# Patient Record
Sex: FEMALE
[De-identification: names, ages, dates, MRNs, and addresses within clinical notes are randomized per-mention and may not be internally consistent; named-entity substitution may affect disease eponyms.]

---

## 2023-02-08 PROBLEM — Z00.00 ENCOUNTER FOR PREVENTIVE HEALTH EXAMINATION: Status: ACTIVE | Noted: 2023-02-08

## 2023-02-10 PROBLEM — Z76.89 ESTABLISHING CARE WITH NEW DOCTOR, ENCOUNTER FOR: Status: ACTIVE | Noted: 2023-02-10

## 2023-02-10 PROBLEM — C54.1 ENDOMETRIAL CANCER: Status: ACTIVE | Noted: 2023-02-10

## 2023-02-13 ENCOUNTER — APPOINTMENT (OUTPATIENT)
Dept: GYNECOLOGIC ONCOLOGY | Facility: CLINIC | Age: 68
End: 2023-02-13
Payer: MEDICARE

## 2023-02-13 ENCOUNTER — NON-APPOINTMENT (OUTPATIENT)
Age: 68
End: 2023-02-13

## 2023-02-13 VITALS
OXYGEN SATURATION: 95 % | BODY MASS INDEX: 39.27 KG/M2 | SYSTOLIC BLOOD PRESSURE: 166 MMHG | HEIGHT: 60 IN | DIASTOLIC BLOOD PRESSURE: 83 MMHG | WEIGHT: 200 LBS | TEMPERATURE: 97.1 F | HEART RATE: 96 BPM

## 2023-02-13 DIAGNOSIS — Z86.39 PERSONAL HISTORY OF OTHER ENDOCRINE, NUTRITIONAL AND METABOLIC DISEASE: ICD-10-CM

## 2023-02-13 DIAGNOSIS — C54.1 MALIGNANT NEOPLASM OF ENDOMETRIUM: ICD-10-CM

## 2023-02-13 DIAGNOSIS — Z87.891 PERSONAL HISTORY OF NICOTINE DEPENDENCE: ICD-10-CM

## 2023-02-13 DIAGNOSIS — Z76.89 PERSONS ENCOUNTERING HEALTH SERVICES IN OTHER SPECIFIED CIRCUMSTANCES: ICD-10-CM

## 2023-02-13 DIAGNOSIS — Z78.9 OTHER SPECIFIED HEALTH STATUS: ICD-10-CM

## 2023-02-13 DIAGNOSIS — Z86.79 PERSONAL HISTORY OF OTHER DISEASES OF THE CIRCULATORY SYSTEM: ICD-10-CM

## 2023-02-13 DIAGNOSIS — Z80.9 FAMILY HISTORY OF MALIGNANT NEOPLASM, UNSPECIFIED: ICD-10-CM

## 2023-02-13 DIAGNOSIS — Z87.39 PERSONAL HISTORY OF OTHER DISEASES OF THE MUSCULOSKELETAL SYSTEM AND CONNECTIVE TISSUE: ICD-10-CM

## 2023-02-13 PROCEDURE — 99204 OFFICE O/P NEW MOD 45 MIN: CPT

## 2023-02-13 RX ORDER — DOCUSATE SODIUM 100 MG/1
100 CAPSULE ORAL
Refills: 0 | Status: ACTIVE | COMMUNITY

## 2023-02-13 RX ORDER — TRAMADOL HYDROCHLORIDE 50 MG/1
50 TABLET, COATED ORAL
Refills: 0 | Status: ACTIVE | COMMUNITY

## 2023-02-13 RX ORDER — DOXYCYCLINE HYCLATE 100 MG/1
100 CAPSULE ORAL
Qty: 10 | Refills: 0 | Status: ACTIVE | COMMUNITY
Start: 2023-02-01

## 2023-02-13 RX ORDER — CYCLOBENZAPRINE HYDROCHLORIDE 10 MG/1
10 TABLET, FILM COATED ORAL
Refills: 0 | Status: ACTIVE | COMMUNITY

## 2023-02-13 RX ORDER — MISOPROSTOL 200 UG/1
200 TABLET ORAL
Qty: 3 | Refills: 0 | Status: ACTIVE | COMMUNITY
Start: 2023-01-31

## 2023-02-13 RX ORDER — TRAZODONE HYDROCHLORIDE 300 MG/1
TABLET ORAL
Refills: 0 | Status: ACTIVE | COMMUNITY

## 2023-02-13 RX ORDER — DICLOFENAC SODIUM 100 MG/1
100 TABLET, FILM COATED, EXTENDED RELEASE ORAL
Refills: 0 | Status: ACTIVE | COMMUNITY

## 2023-02-13 RX ORDER — ACETAMINOPHEN 500 MG/1
500 TABLET, FILM COATED ORAL
Qty: 2 | Refills: 0 | Status: ACTIVE | COMMUNITY
Start: 2023-01-31

## 2023-02-13 RX ORDER — TELMISARTAN 80 MG/1
80 TABLET ORAL
Refills: 0 | Status: ACTIVE | COMMUNITY

## 2023-02-13 RX ORDER — ROSUVASTATIN CALCIUM 20 MG/1
20 TABLET, FILM COATED ORAL
Refills: 0 | Status: ACTIVE | COMMUNITY

## 2023-02-14 NOTE — PHYSICAL EXAM
[Chaperone Present] : A chaperone was present in the examining room during all aspects of the physical examination [Normal] : Recto-Vaginal Exam: Normal [Fully active, able to carry on all pre-disease performance without restriction] : Status 0 - Fully active, able to carry on all pre-disease performance without restriction [de-identified] : 8 wk size uterus, mobile, no adnexal masses [de-identified] : smooth rectovag septum, no adnexal masses

## 2023-02-14 NOTE — DISCUSSION/SUMMARY
[Reviewed Clinical Lab Test(s)] : Results of clinical tests were reviewed. [Reviewed Radiology Report(s)] : Radiology reports were reviewed. [Reviewed Radiology Film/Image(s)] : Images from radiology studies were reviewed and examined. [FreeTextEntry1] : Patient presenting with new diagnosis of endometrial cancer, high grade. h/o  bleeding , noted to have abnormal pap, then colpo and endometrial bx was performed by Dr. Guy. Cervix is normal on exam.\par discussed dx and standard treatment is hysterectomy/staging. possible need for chemo/RT discussed\par \par Planned for RA - total laparoscopic hysterectomy, bilateral salpingo-oophorectomy, sentinel lymph node mapping and biopsies, staging, possible pelvic and para-aortic lymph node dissection, possible open surgery, cystoscopy, all indicated procedures.\par \par Risks of surgery discussed including bleeding, infection, injury to bowel/bladder/vessels/nerves/ureters/ risk of blood transfusion - pt accepts blood, risk of ICU admission, reoperation, anesthesia risk\par \par Benefits including staging, treatment discussed with patient\par Patient aware that she may need further treatment including chemotherapy or radiation, or both\par All questions answered , Surgery will be pending CT review.\par \par Plan for OR 3/10 [facility]\par med clearance\par pst/covid test\par ct ordered\[facility]

## 2023-02-14 NOTE — REVIEW OF SYSTEMS
Pt rcv'd to rm 7, awake and alert, oriented x 1, Pt was found knocking on the door of a home of occupants who didn't know the patient so police were called per [employer]D, pt heavily intoxicated w/ETOH, pt awaiting provider assessment, not allowing monitor placement, safety measures maintained, sitter @ bedside   [Negative] : Musculoskeletal [Abn Vag Bleeding] : abnormal vaginal bleeding [FreeTextEntry4] : low back pain

## 2023-02-14 NOTE — HISTORY OF PRESENT ILLNESS
[FreeTextEntry1] : GYN/Ref: Dr. Guy\par \par Pt is [place] speaking.  She attends today's visit with her grand-daughter, Zuleima. \par \par Ms. Antoine, 67 years old, referred by Dr. Guy for  high grade endometrial cancer.\par \par Pt had spotting 6 months ago while in [place].  Per patient, she saw a doctor in [place] and was advised it was nothing.  She is still spotting and followed up here in [place] revealing high grade endometrial cancer.  \par \par Denies f/c/n/v/d/changes bladder habits.  Denies unintentional weight loss or gain. Does report constipation, but attributes this to taking Tramadol for low back pain, which radiates to her left side.  \par \par Pathology\par 2/1/23:  EMB:  High grade endometrial carcinoma with glandular and squamous epithelium. ([facility])\par \par 12/10/22:\par Cervix:  LSIL, OLIVIA 1; no endocervical tissue.\par Endocervix::  LSIL, OLIVIA 1.  Separate fragments of benign endocervical tissue are present.  ([facility])\par \par PAP:\par 11/19/22:  ASCUS;  reactive endocervical cells identified;  Endometrial cells present in a woman 45 years or older. ([facility])  \par 2/12/22:  ASCUS; reactive endocervical cells identified.  Endometrial cells present in a woman 45 years or older. ([facility])\par \par Imaging:\par 11/22/22\par UTERUS: The uterus is anteverted, measuring 6.8 x 4.4 x 4.9 cm (77 cc). No discrete fibroids.\par ENDOMETRIUM: Normal thickness, echotexture and morphology; 0.87 cm maximum thickness. There is minimal fluid within the endometrial cavity.\par RIGHT OVARY: Measures 2.1 x 0.8 x 0.8 cm (1 cc). Normal morphology and vascularity. There is no right adnexal lesion.\par LEFT OVARY: Measures 1.4 x 0.8 x 1.1 cm (1 cc). Normal morphology and vascularity. There is no left adnexal lesion.\par FREE FLUID: None.\par IMPRESSION:  \par Minimal endometrial fluid without evidence of focal mass.\par \par Health Maintenance:\par BMI: 39\par Lifestyle: lives with son and grand-daughter; former smoker (smoked for 45 years 1ppd; quit 7 years ago) \par COVID vaccine received:  yes\par Mammogram: 1 year ago in [place] - normal per pt \par Colonoscopy: 6 months ago in [place] - normal per patient.\par PAP: see above\par \par

## 2023-02-14 NOTE — LETTER BODY
[Dear  ___] : Dear  [unfilled], [I had the pleasure of evaluating your patient, [unfilled] for ___] : I had the pleasure of evaluating your patient, [unfilled] for [unfilled]. [Attached please find my note.] : Attached please find my note. [FreeTextEntry1] : ct results

## 2023-02-15 ENCOUNTER — NON-APPOINTMENT (OUTPATIENT)
Age: 68
End: 2023-02-15

## 2023-02-16 ENCOUNTER — NON-APPOINTMENT (OUTPATIENT)
Age: 68
End: 2023-02-16

## 2023-02-17 ENCOUNTER — APPOINTMENT (OUTPATIENT)
Dept: INTERNAL MEDICINE | Facility: CLINIC | Age: 68
End: 2023-02-17

## 2023-02-24 ENCOUNTER — RESULT REVIEW (OUTPATIENT)
Age: 68
End: 2023-02-24

## 2023-02-24 ENCOUNTER — OUTPATIENT (OUTPATIENT)
Dept: OUTPATIENT SERVICES | Facility: HOSPITAL | Age: 68
LOS: 1 days | End: 2023-02-24

## 2023-02-24 DIAGNOSIS — R87.612 LOW GRADE SQUAMOUS INTRAEPITHELIAL LESION ON CYTOLOGIC SMEAR OF CERVIX (LGSIL): ICD-10-CM

## 2023-03-01 LAB — SURGICAL PATHOLOGY STUDY: SIGNIFICANT CHANGE UP
